# Patient Record
(demographics unavailable — no encounter records)

---

## 2024-10-21 NOTE — HISTORY OF PRESENT ILLNESS
[FreeTextEntry1] : ra on stelara, switching to rinvoq also metotrexate qol 2-3 has dietary/social/work/sexual restrictions wants to proceed with surgery   wants to have surgery after new year she will come to the office for an in person appointment

## 2024-10-28 NOTE — HISTORY OF PRESENT ILLNESS
[FreeTextEntry1] : Chief complaint: acute exacerbation of Crohn's colitis   HPI  Patient presents for evaluation and management of multiple complex Gastrointestinal signs and symptoms. Has acute exacerbation of Crohn's colitis. Intermittent crampy rlq and llq abdominal pain.   For the acute exacerbation of Crohn's colitis, patient has already failed prednisone therapy. Her Crohn's colitis continues to flare.   Patient is afraid of needles. The best option for the patient would be to start treatment with Rinvoq, loading dose of 45 milligrams per day.   The risks, benefits, alternatives of Rinvoq therapy were reviewed with patient who agrees.

## 2024-10-28 NOTE — PHYSICAL EXAM
[Alert] : alert [Normal Voice/Communication] : normal voice/communication [Healthy Appearing] : healthy appearing [No Acute Distress] : no acute distress [Sclera] : the sclera and conjunctiva were normal [Hearing Threshold Finger Rub Not [place]] : hearing was normal [Normal Lips/Gums] : the lips and gums were normal [Oropharynx] : the oropharynx was normal [Normal Appearance] : the appearance of the neck was normal [No Neck Mass] : no neck mass was observed [No Respiratory Distress] : no respiratory distress [No Acc Muscle Use] : no accessory muscle use [Respiration, Rhythm And Depth] : normal respiratory rhythm and effort [Auscultation Breath Sounds / Voice Sounds] : lungs were clear to auscultation bilaterally [Heart Rate And Rhythm] : heart rate was normal and rhythm regular [Normal S1, S2] : normal S1 and S2 [Murmurs] : no murmurs [Bowel Sounds] : normal bowel sounds [Abdomen Tenderness] : non-tender [No Masses] : no abdominal mass palpated [Abdomen Soft] : soft [] : no hepatosplenomegaly [Oriented To Time, Place, And Person] : oriented to person, place, and time

## 2024-10-28 NOTE — ASSESSMENT
[FreeTextEntry1] : Patient presents for Gastroenterology followup because of multiple complex Gastrointestinal signs and symptoms. '  Patient with severe acute exacerbation of Crohn's colitis   Patient has already failed prednisone.   She is afraid of needles, so best treatment option would be initiation of treatment with Rinvoq  Noiderate degree of complexity of medical decision making was made in this patient encounter

## 2024-11-11 NOTE — PHYSICAL EXAM
[No HSM] : no hepatosplenomegaly [No Rash or Lesion] : No rash or lesion [Alert] : alert [Oriented to Person] : oriented to person [Oriented to Place] : oriented to place [Oriented to Time] : oriented to time [Calm] : calm [Abdomen Masses] : No abdominal masses [Abdomen Tenderness] : ~T No ~M abdominal tenderness [Wheezing] : no wheezing was heard [de-identified] : no incision scar [de-identified] : well-appearing, awake, alert and oriented x3

## 2024-11-11 NOTE — ASSESSMENT
[FreeTextEntry1] : Ms. Guerra is a 51 yo aldy with Crohn's colitis. She is here today to discuss surgery in more detail.  She failed multiple medical therapies and her QOL is poor. She is willing to proceed with surgery.  We had a long discussion about the surgical treatment and alternatives. Risks, benefits and potential morbidity were discussed in detail. Staged approach was explained. Initial surgery will be total abdominal colectomy and end ileostomy. I believe she is a good candidate for an ileorectal anastomosis down the line.  She wants to have surgery after the new year. I would like her to reduce or stop methotrexate before the surgery if possible.  I spent 45 minutes with the patient discussing the plan of care. All questions were answered.

## 2024-11-11 NOTE — HISTORY OF PRESENT ILLNESS
[FreeTextEntry1] : Ms. Guerra presented to the office with her mother and daughter.  She is referred by Dr. Manuel and Dr. Patel.  She was diagnosed in 2011 with Crohn's. She also has RA. She is experiencing frequent flare ups. She failed multiple biologics. She is currently on Stelara, switching to rinvoq. She is also on methotrexate for RA.  She states her QOL is 2-3/10. She has dietary/social/work/sexual restrictions. On colonoscopy, inflammation was found in entire colon, sparing the rectum. She states that she can't go on like this and wants to proceed with surgery.  Most recent CT: IMPRESSION: Acutely inflamed distal sigmoid may reflect acute diverticulitis and/or Crohn's colitis in this clinical setting. No extraluminal gas or drainable collection  Pancolonic diverticulosis  No prior abdominal surgery  She is on another course of cipro for a recent flare.

## 2024-12-30 NOTE — PHYSICAL EXAM
[No HSM] : no hepatosplenomegaly [No Rash or Lesion] : No rash or lesion [Alert] : alert [Oriented to Person] : oriented to person [Oriented to Place] : oriented to place [Oriented to Time] : oriented to time [Calm] : calm [Abdomen Masses] : No abdominal masses [Abdomen Tenderness] : ~T No ~M abdominal tenderness [Wheezing] : no wheezing was heard [de-identified] : Well-appearing, awake, alert, oriented x 3

## 2024-12-30 NOTE — ASSESSMENT
[FreeTextEntry1] : Ms. Guerra is a 49 yo aldy with Crohn's colitis. She is here today for preop.  She failed multiple medical therapies and her QOL is poor. She is willing to proceed with surgery.  She is scheduled for robotic TAC + EI on Wednesday Jan 8. I believe she is a good candidate for an ileorectal anastomosis down the line. We discussed risks benefits and potential morbidity.  Is going to be treated for 5-day hospital stay with 4 to 6 weeks home recovery. She is going to see Faith Jones later today for ostomy marking.  She has stopped methotrexate about a month ago and has not been on steroids for several months now.  I spent 30 minutes with the patient discussing the plan of care. All questions were answered.

## 2024-12-30 NOTE — HISTORY OF PRESENT ILLNESS
[FreeTextEntry1] : Ms. Guerra presented to the office for preop.  She is referred by Dr. Manuel and Dr. Patel.  She was diagnosed in 2011 with Crohn's. She also has RA. She is experiencing frequent flare ups. She failed multiple biologics. She is currently on Stelara, switching to rinvoq. She is also on methotrexate for RA, stopped a month ago. Has not been on prednisone for several months now.  She states her QOL is 2-3/10. She has dietary/social/work/sexual restrictions. On colonoscopy, inflammation was found in entire colon, sparing the rectum. She states that she can't go on like this and wants to proceed with surgery.  Most recent CT: IMPRESSION: Acutely inflamed distal sigmoid may reflect acute diverticulitis and/or Crohn's colitis in this clinical setting. No extraluminal gas or drainable collection  Pancolonic diverticulosis  No prior abdominal surgery.

## 2025-01-27 NOTE — HISTORY OF PRESENT ILLNESS
[FreeTextEntry1] : doing well still not back to normal energy level but getting better ostomy function is good incisions are clean and dry reviewed pathology robotic NAEEM in july

## 2025-03-27 NOTE — ASSESSMENT
[FreeTextEntry1] : Patient with complicated case of exacerbation of ileocolonic Crohn's disease. She is status post robotic total colectomy.   Plan:  1) Low residue diet   2) Rinvoq prescribed, same dose as before   3) Patient anticipated to have two further stages of colon surgery, to create an anastomosis and to close to ileostomy  Medications, problem list and allergies were reviewed and reconciled.

## 2025-03-27 NOTE — HISTORY OF PRESENT ILLNESS
[FreeTextEntry1] : Chief complaint: Crohn's disease exacerbation, esophagitis   HPI: Patient presents for followup of complex case of complicated ileocolonic Crohn's disease. After non response to maximal medical therapy, patient underwent robotic total colectomy with end ileostomy. The patient is anticipated to have two more stages of robotic surgery, first where an anastamosis is made and secondly when the ileostomy itself is reversed.   The patient has been doing well with the ileostomy and has not had any issues with regard to the ostomy care.  She feels better overall and denies abdominal pain. Denies any other systemic symptoms such as nausea, odynophagia, chills, pyrosis or rigors.   I extensively reviewed multiple reports on the patient as part of this visit including reviewing of discharge summary and operative report from her colon surgery.   I spoke with the operating colorectal surgeon and discussed the case; care was coordinated.   New prescription was sent for Rinvoq

## 2025-05-03 NOTE — ASSESSMENT
[FreeTextEntry1] : DIAGNOSIS  CONCERN FOR PARTIAL SMALL BOWEL OBSTRUCTION OR FLARE OF ILEITIS IN THIS PATIENT WITH STATUS POST TOTAL COLECTOMY AND ILEOSTOMY  PLAN LOW RESIDUE DIET  NEW DIAGNOSTIC TEST ORDERED, CAT SCAN OF ABDOMEN AND PELVIS WITH ORAL AND IV CONTRAST NEW REFERRAL TO DR. NIELSEN FROM COLORECTAL SURGERY NEW PRESCRIPTION SENT FOR CIPROFLOXACIN  I HAVE SPENT 32 MINUTES ON THE ENCOUNTER WHICH EXCLUDES TEACHING OR SPERATELY REPORTED SERVICES

## 2025-05-03 NOTE — HISTORY OF PRESENT ILLNESS
[FreeTextEntry1] : Chief complaint: abdominal pain, history of total colectomy  HPI: Patient presents for evaluation and management of multiple complex Gastrointestinal signs and symptoms. Complex syndrome of abdominal pain with intermittent crampy rlq and llq abdominal pain. The patient with a history of  total colectomy and currently has an ileostomy.   Patient had an episode after she ate some cauliflower of having no gas or stool output in the ileostomy, and had significant abdominal pain and tenderness. In the last several days, she has been feeling somewhat better and now has the normal amount of stool and air in the ileostomy , but still has rlq and llq tenderness to palpation.   Concern for partial small bowel obstruction from peritoneal adhesions, or wall thickening of the distal small instestine due to inflammatory bowel disease.  New diagnostic test ordered, cat scan of abdomen and pelvis with oral and iv contrat.  New prescription sent to the pharmacy for ciprofloxacin.

## 2025-05-03 NOTE — PLAN
[TextEntry] : Low residue diet  Expedited referral to Dr. Vickers from colorectal surgery  Cipro 500 mg po bid 14 days

## 2025-05-03 NOTE — PHYSICAL EXAM
[Normal] : heart rate was normal and rhythm regular, normal S1 and S2, no murmurs [Normal Mood] : the mood was normal [de-identified] : ileostomy appears normal, moderate rlq and llq tenderness; no rebound or guarding  [de-identified] : normal  [de-identified] : normal

## 2025-05-05 NOTE — PHYSICAL EXAM
[No HSM] : no hepatosplenomegaly [No Rash or Lesion] : No rash or lesion [Alert] : alert [Oriented to Person] : oriented to person [Oriented to Place] : oriented to place [Oriented to Time] : oriented to time [Calm] : calm [Abdomen Masses] : No abdominal masses [Abdomen Tenderness] : ~T No ~M abdominal tenderness [Wheezing] : no wheezing was heard [de-identified] : DLI in RLQ [de-identified] : well-appearing, awake, alert and oriented x3

## 2025-05-05 NOTE — ASSESSMENT
[FreeTextEntry1] : Ms. Guerra is a 51-year-old lady with Crohn's colitis status post robotic total abdominal colectomy.  She recovered well from surgery and has been doing well until last Tuesday when she had a transient small bowel obstruction after eating cauliflower.  We went over the low fiber diet and I recommended for her to avoid fiber rich foods until the surgery.  We went over the surgery, hospital stay, postop course, risks benefits and potential morbidity.  She is going to have a robotic ileal rectal anastomosis with diverting loop ileostomy.  Surgery is about 2 hours and hospital stay is going to be 3 to 5 days.  Home recovery is going to be 4 to 6 weeks.  I spent 30 minutes with the patient and her mother discussing the plan of care.  All questions were answered.

## 2025-05-05 NOTE — HISTORY OF PRESENT ILLNESS
[FreeTextEntry1] : Ms. Guerra presented for follow-up.  Last week she has a transient small bowel obstruction after eating cauliflower's.  She backed down to clear liquids and the obstruction resolved the next day.  She saw Dr. Manuel and was ordered the CAT scan which she is going to have.  She is otherwise doing well.  This was the first episode she had an obstruction.  She is having some mucus discharge per rectum, no blood.  She is scheduled for ileorectal anastomosis on July 9.

## 2025-06-23 NOTE — PHYSICAL EXAM
[No HSM] : no hepatosplenomegaly [No Rash or Lesion] : No rash or lesion [Alert] : alert [Oriented to Person] : oriented to person [Oriented to Place] : oriented to place [Oriented to Time] : oriented to time [Calm] : calm [Abdomen Masses] : No abdominal masses [Abdomen Tenderness] : ~T No ~M abdominal tenderness [Wheezing] : no wheezing was heard [de-identified] : DLI on RLQ. Mild parastomal hernia present [de-identified] : well-appearing, awake, alert and oriented x4

## 2025-06-23 NOTE — HISTORY OF PRESENT ILLNESS
[FreeTextEntry1] : Ms. Guerra presented to the office for preop appointment.  She has a history of Crohn's colitis and underwent a robotic total abdominal colectomy January.  Currently she is doing well.  She does not have any complaints.  Her pain is resolved.  She is tolerating  low fiber diet is not having any rectal discharge.  She does not have any issues with the stoma care.  Her pathology did not show any malignancy.  We are going to proceed with ileorectal anastomosis and July.  She is on methotrexate and Xeljanz for rheumatoid arthritis.  She stopped methotrexate about a month ago and is currently only on Xeljanz.

## 2025-06-23 NOTE — ASSESSMENT
[FreeTextEntry1] : Ms. Guerra is a 51-year-old lady with Crohn's colitis status post total abdominal colectomy.  She recovered well from her surgery and is now presenting for the second stage.  We went over the pathology and the next surgery.  I am also going to do it with the robot.  We discussed the diverting loop ileostomy for 3 more months.  We discussed potential risks and benefits, surgery time, hospital stay and postop recovery.  She understands and wants to proceed.  She is on the list in July.  She is not going to have a bowel prep at this time.  She is stopped methotrexate in preparation for the surgery and is tolerating well so far.  Spent 45 minutes with the patient and her  and daughter discussing the plan of care.  All questions were answered.

## 2025-06-23 NOTE — PHYSICAL EXAM
[No HSM] : no hepatosplenomegaly [No Rash or Lesion] : No rash or lesion [Alert] : alert [Oriented to Person] : oriented to person [Oriented to Place] : oriented to place [Oriented to Time] : oriented to time [Calm] : calm [Abdomen Masses] : No abdominal masses [Abdomen Tenderness] : ~T No ~M abdominal tenderness [Wheezing] : no wheezing was heard [de-identified] : DLI on RLQ. Mild parastomal hernia present [de-identified] : well-appearing, awake, alert and oriented x4

## 2025-06-26 NOTE — HISTORY OF PRESENT ILLNESS
[FreeTextEntry1] :  Chief complaint: IBD  HPI: Patient with inflammatory bowel disease, status post ileostomy. She is scheduled in two weeks for the next stage of her surgery with reanastamosis. She feels better and denies abdominal pain at present, no fever, sweats or chills.   Medications, allergies, and problem list were reviewed and reconciled. On physical examination, ostomy pink and intact, has a parastomal hernia.   I reviewed with the patient images and report from her cat scan of abdomen and pelvis.    As part of this complex Gastrointestinal evaluation, I extensively reviewed prior medical records on the patient including laboratory reports, medical imaging reports, and subspecialty consultations. Medications, allergies, and problem list were reviewed and reconciled.

## 2025-06-26 NOTE — PHYSICAL EXAM
[Alert] : alert [Normal Voice/Communication] : normal voice/communication [Healthy Appearing] : healthy appearing [No Acute Distress] : no acute distress [Sclera] : the sclera and conjunctiva were normal [Hearing Threshold Finger Rub Not [place]] : hearing was normal [Normal Lips/Gums] : the lips and gums were normal [Oropharynx] : the oropharynx was normal [Normal Appearance] : the appearance of the neck was normal [No Neck Mass] : no neck mass was observed [No Respiratory Distress] : no respiratory distress [No Acc Muscle Use] : no accessory muscle use [Respiration, Rhythm And Depth] : normal respiratory rhythm and effort [Auscultation Breath Sounds / Voice Sounds] : lungs were clear to auscultation bilaterally [Heart Rate And Rhythm] : heart rate was normal and rhythm regular [Normal S1, S2] : normal S1 and S2 [Murmurs] : no murmurs [Bowel Sounds] : normal bowel sounds [No Masses] : no abdominal mass palpated [Abdomen Tenderness] : non-tender [] : no hepatosplenomegaly [Abdomen Soft] : soft [Oriented To Time, Place, And Person] : oriented to person, place, and time

## 2025-06-26 NOTE — ASSESSMENT
[FreeTextEntry1] :  ASSESSMENT IBD, status post ileostomy  PLAN Patient doing better. Scheduled in two weeks for the next stage of her surgery with re anastamosis.  ct scan results reviewed with patient.  Medications, allergies, and problem list were reviewed and reconciled.